# Patient Record
Sex: FEMALE
[De-identification: names, ages, dates, MRNs, and addresses within clinical notes are randomized per-mention and may not be internally consistent; named-entity substitution may affect disease eponyms.]

---

## 2018-10-26 ENCOUNTER — HOSPITAL ENCOUNTER (INPATIENT)
Dept: HOSPITAL 89 - ER | Age: 53
LOS: 1 days | Discharge: HOME | DRG: 176 | End: 2018-10-27
Attending: INTERNAL MEDICINE | Admitting: INTERNAL MEDICINE
Payer: SELF-PAY

## 2018-10-26 VITALS — SYSTOLIC BLOOD PRESSURE: 118 MMHG | DIASTOLIC BLOOD PRESSURE: 73 MMHG

## 2018-10-26 VITALS — SYSTOLIC BLOOD PRESSURE: 133 MMHG | DIASTOLIC BLOOD PRESSURE: 78 MMHG

## 2018-10-26 VITALS
HEIGHT: 57 IN | BODY MASS INDEX: 47.46 KG/M2 | HEIGHT: 57 IN | WEIGHT: 220 LBS | WEIGHT: 220 LBS | BODY MASS INDEX: 47.46 KG/M2

## 2018-10-26 VITALS — SYSTOLIC BLOOD PRESSURE: 133 MMHG | DIASTOLIC BLOOD PRESSURE: 88 MMHG

## 2018-10-26 DIAGNOSIS — R09.02: ICD-10-CM

## 2018-10-26 DIAGNOSIS — Z88.2: ICD-10-CM

## 2018-10-26 DIAGNOSIS — Z88.8: ICD-10-CM

## 2018-10-26 DIAGNOSIS — F17.210: ICD-10-CM

## 2018-10-26 DIAGNOSIS — I82.432: ICD-10-CM

## 2018-10-26 DIAGNOSIS — I82.492: ICD-10-CM

## 2018-10-26 DIAGNOSIS — J44.9: ICD-10-CM

## 2018-10-26 DIAGNOSIS — I82.412: ICD-10-CM

## 2018-10-26 DIAGNOSIS — E66.9: ICD-10-CM

## 2018-10-26 DIAGNOSIS — I10: ICD-10-CM

## 2018-10-26 DIAGNOSIS — I26.99: Primary | ICD-10-CM

## 2018-10-26 DIAGNOSIS — I82.812: ICD-10-CM

## 2018-10-26 DIAGNOSIS — D50.0: ICD-10-CM

## 2018-10-26 DIAGNOSIS — I42.9: ICD-10-CM

## 2018-10-26 LAB
INR PPP: 1.09
PLATELET COUNT, AUTOMATED: 239 K/UL (ref 150–450)

## 2018-10-26 PROCEDURE — 36415 COLL VENOUS BLD VENIPUNCTURE: CPT

## 2018-10-26 PROCEDURE — 82040 ASSAY OF SERUM ALBUMIN: CPT

## 2018-10-26 PROCEDURE — 96361 HYDRATE IV INFUSION ADD-ON: CPT

## 2018-10-26 PROCEDURE — 84520 ASSAY OF UREA NITROGEN: CPT

## 2018-10-26 PROCEDURE — 84450 TRANSFERASE (AST) (SGOT): CPT

## 2018-10-26 PROCEDURE — 82565 ASSAY OF CREATININE: CPT

## 2018-10-26 PROCEDURE — 83540 ASSAY OF IRON: CPT

## 2018-10-26 PROCEDURE — 81001 URINALYSIS AUTO W/SCOPE: CPT

## 2018-10-26 PROCEDURE — 71275 CT ANGIOGRAPHY CHEST: CPT

## 2018-10-26 PROCEDURE — 84132 ASSAY OF SERUM POTASSIUM: CPT

## 2018-10-26 PROCEDURE — 83880 ASSAY OF NATRIURETIC PEPTIDE: CPT

## 2018-10-26 PROCEDURE — 84460 ALANINE AMINO (ALT) (SGPT): CPT

## 2018-10-26 PROCEDURE — 83550 IRON BINDING TEST: CPT

## 2018-10-26 PROCEDURE — 82310 ASSAY OF CALCIUM: CPT

## 2018-10-26 PROCEDURE — 96365 THER/PROPH/DIAG IV INF INIT: CPT

## 2018-10-26 PROCEDURE — 82247 BILIRUBIN TOTAL: CPT

## 2018-10-26 PROCEDURE — 84075 ASSAY ALKALINE PHOSPHATASE: CPT

## 2018-10-26 PROCEDURE — 82435 ASSAY OF BLOOD CHLORIDE: CPT

## 2018-10-26 PROCEDURE — 99285 EMERGENCY DEPT VISIT HI MDM: CPT

## 2018-10-26 PROCEDURE — 84484 ASSAY OF TROPONIN QUANT: CPT

## 2018-10-26 PROCEDURE — 84155 ASSAY OF PROTEIN SERUM: CPT

## 2018-10-26 PROCEDURE — 93005 ELECTROCARDIOGRAM TRACING: CPT

## 2018-10-26 PROCEDURE — 82947 ASSAY GLUCOSE BLOOD QUANT: CPT

## 2018-10-26 PROCEDURE — 85025 COMPLETE CBC W/AUTO DIFF WBC: CPT

## 2018-10-26 PROCEDURE — 94640 AIRWAY INHALATION TREATMENT: CPT

## 2018-10-26 PROCEDURE — 85610 PROTHROMBIN TIME: CPT

## 2018-10-26 PROCEDURE — 82374 ASSAY BLOOD CARBON DIOXIDE: CPT

## 2018-10-26 PROCEDURE — 84295 ASSAY OF SERUM SODIUM: CPT

## 2018-10-26 PROCEDURE — 85730 THROMBOPLASTIN TIME PARTIAL: CPT

## 2018-10-26 RX ADMIN — WARFARIN SODIUM SCH MG: 5 TABLET ORAL at 20:46

## 2018-10-26 NOTE — RADIOLOGY IMAGING REPORT
FACILITY: [facility] 

 

PATIENT NAME: Liz Villafana

: 1965

MR: [id number]

V: [id number]

EXAM DATE: [id number]

ORDERING PHYSICIAN: RALEIGH LEONG

TECHNOLOGIST: 

 

Location: [facility] [place]

Patient: Liz Villafana

: 1965

MRN: [id number]

Visit/Account:[id number]

Date of Sevice: 10/26/2018

 

ACCESSION #: [id number]

 

CTA CHEST WW/O CNTR (PULM ANG)

 

HISTORY:  RESP DISTRESS

 

ADDITIONAL HISTORY:  None.

 

TECHNIQUE:  CTA chest with intravenous contrast.  Axial imaging acquired following administration of 
IV contrast timed for maximum opacification of the pulmonary arterial vasculature.  Slab 3-D MIP frantz
nstructed images were also created for further evaluation and interpretation. Reconstruction of the [facility] data set includes multiplanar 2-D in the sagittal and coronal planes and 3-D reconstructed josefina
nal slab MIP series.  3-D images were created by the technologist.Dose Lowering Technique

 

One of the following dose optimization techniques was utilized in the performance of this exam: Autom
ated exposure control; adjustment of the mA and/or kV according to the patient's size; or use of an i
terative  reconstruction technique.  Specific details can be referenced in the facility's radiology C
T exam operational policy.

 

CONTRAST:  75  mL Isovue-370

 

COMPARISON:  None.

 

FINDINGS:

 

Lungs/pleura:  Negative.

 

Heart/vessels:  Pulmonary emboli is identified in the lobar segmental and subsegmental arterial branc
hes to the right lower lobe in the lobar and segmental vessels to the right middle lobe, segmental an
d subsegmental arterial branches to the left lower lobe.

 

Mediastinum/lymph nodes:  Negative.

 

Visualized upper abdomen:  Negative.

 

Bones/soft tissues:  Negative.

 

Additional findings:  None

 

IMPRESSION:

 

There is a moderate volume of pulmonary emboli in the lobar segmental and subsegmental arterial branc
hes to the right lower lobe, lobar and segmental vessels to the right middle lobe and segmental and s
ubsegmental arterial branches to the left lower lobe.

 

Results were called to RALEIGH LEONG at 10/26/2018 3:32 PM.

 

Report Dictated By: Queenie Evans MD at 10/26/2018 3:27 PM

 

Report E-Signed By: Queenie Evans MD  at 10/26/2018 3:42 PM

 

WSN:[id number]

## 2018-10-26 NOTE — ER REPORT
History and Physical


Time Seen By MD:  13:42


Hx. of Stated Complaint:  


presents from home for increasing L leg pain x 1 week, increased SOB, was due 


for CT scan in September but did not go.


HPI/ROS


CHIEF COMPLAINT: Shortness breath, left lower extremity swelling, increased 


dyspnea





HISTORY OF PRESENT ILLNESS: Patient is a 53-year-old female with a history of 


asthma here with complaints of left lower extremity edema, increasing shortness 


breath, hypoxia in the setting of asthma with suspected exacerbation. Patient 


reportedly was seen several days ago at urgent care several days ago and placed 


on prednisone, Z-Robert for suspected may acquired pneumonia. Patient reports 


increased dyspnea, cough, left lower extremity calf pain and swelling. Patient 


was found to be hypoxic in the 80s with no prior supplemental oxygen r


equirement. She is a current smoker of approximately one half pack per day.





REVIEW OF SYSTEMS:


Constitutional: No fever, no chills.


Eyes: No discharge.


ENT: No sore throat. 


Cardiovascular: + mild chest pain, no palpitations.


Respiratory: + cough, + increased shortness of breath.


Gastrointestinal: No abdominal pain, no vomiting.


Genitourinary: No hematuria.


Musculoskeletal: + left calf tenderness x 1 week


Skin: No rashes.


Neurological: No headache.


Allergies:  


Coded Allergies:  


     Sulfa (Sulfonamide Antibiotics) (Verified  Allergy, Unknown, 10/26/18)


     cefuroxime (Verified  Allergy, Unknown, 10/26/18)


Home Meds


Active Scripts


Iron Ag,Ps/C/Fa6/B12/Zn/SA/Sto (Niferex Tablet) 150MG-60-1 Tablet, 1 TAB PO BID,


#60 TAB 1 Refill


   Prov:ARIANNA CAZARES MD         10/27/18


Oxycodone Hcl (OXYCODONE HCL) 5 Mg Tablet, 5-10 MG PO Q6H PRN for PAIN for 7 


Days, #30 TAB 0 Refills


   Prov:ARIANNA CAZARES MD         10/27/18


Enoxaparin Sodium (LOVENOX) 100 Mg/1 Ml Disp.syrin, 100 MG SQ Q12H for 3 Days, 


#6 SYR


   Prov:ARIANNA CAZARES MD         10/27/18


Warfarin Sodium (COUMADIN) 5 Mg Tablet, 5 MG PO QDAY@13 for 30 Days, #30 TAB 0 


Refills


   Prov:ARIANNA CAZARES MD         10/27/18


Reported Medications


Lisinopril (LISINOPRIL) 20 Mg Tablet, 20 MG PO QDAY, TAB


   10/26/18


Metoprolol Succinate (METOPROLOL SUCCINATE) 25 Mg Tab.er.24h, 1 TAB PO QDAY, TAB


   10/26/18


Azithromycin (ZITHROMAX) 250 Mg Tablet, 1 TAB PO QDAY, TAB


   10/26/18


Prednisone (PREDNISONE) 20 Mg Tablet, 20 MG PO BID, TAB


   10/26/18


Albuterol Sulfate 90 Mcg/Act (PROAIR HFA 90 MCG/ACT) 8.5 Gm Hfa.aer.ad, 1-2 PUFF


IH 3-4XD, INHALER


   10/26/18


Mometasone/Formoterol (DULERA 100 MCG/5 MCG INHALER) 13 Gm Inh, 13 GM INH, INH


   10/26/18


Discontinued Reported Medications


Budesonide (BUDESONIDE) 0.25 Mg/2 Ml Ampul.neb, 0.25 MG IH BID, ML


   10/26/18


Lisinopril (LISINOPRIL) 10 Mg Tablet, 25 MG PO QDAY, TAB


   10/26/18


Lisinopril (LISINOPRIL) 20 Mg Tablet, 20 MG PO QDAY, TAB


   10/26/18


Constitutional





Vital Sign - Last 24 Hours








 10/26/18 10/26/18 10/26/18 10/26/18





 13:33 13:56 13:56 14:02


 


Temp 98.5   


 


Pulse 73  74 76


 


Resp 16  18 18


 


B/P (MAP) 149/97   


 


Pulse Ox 86 95  


 


O2 Delivery Room Air Nasal Cannula  


 


O2 Flow Rate  2.0  


 


    





 10/26/18 10/26/18 10/26/18 10/26/18





 14:02 14:20 14:22 15:37


 


Pulse  72  68


 


Resp  16  16


 


B/P (MAP)  149/97 (114)  139/85 (103)


 


Pulse Ox 94 94  92


 


O2 Delivery Nasal Cannula Room Air  Nasal Cannula


 


O2 Flow Rate 2.0 2 2.0 2








Physical Exam


   General Appearance: The patient is alert, has no immediate need for airway 


protection and no signs of toxicity. Mild distress secondary to shortness of 


breath.


Eyes: Pupils equal and round no pallor or injection.


ENT, Mouth: Mucous membranes are moist.


Respiratory: There are no retractions, lungs are clear to auscultation.


Cardiovascular: Regular rate and rhythm. 


Gastrointestinal:  Abdomen is soft and non tender, no masses, bowel sounds 


normal.


Neurological: NV intact with no focal deficits


Skin: Warm and dry, no rashes.


Musculoskeletal:  Neck is supple non tender.


      Left calf tenderness with mild edema without erythema





DIFFERENTIAL DIAGNOSIS: After history and physical exam differential diagnosis 


was considered for shortness of breath including but not limited to pulmonary 


infectious process, COPD, asthma, pulmonary embolus and congestive heart 


failure, DVT





Medical Decision Making


Data Points


Result Diagram:  


10/27/18 0353                                                                   


            10/27/18 0353





Laboratory





Hematology








Test


 10/26/18


13:35 10/26/18


16:06


 


Activated Partial


Thromboplast Time 26 seconds


(23-35) 





 


Total Bilirubin


 0.4 mg/dl


(0.2-1.3) 





 


Aspartate Amino Transf


(AST/SGOT) 48 U/L (0-35) 


 





 


Alanine Aminotransferase


(ALT/SGPT) 113 U/L (0-56) 


 





 


Alkaline Phosphatase 92 U/L (0-126)  


 


Troponin I < 0.012 ng/ml  


 


B-Type Natriuretic Peptide


 357 pg/ml


(0-100) 





 


Total Protein


 7.1 g/dl


(6.3-8.2) 





 


Albumin


 4.1 g/dl


(3.5-5.0) 





 


Urine Color  Yellow 


 


Urine Clarity  Clear 


 


Urine pH


 


 6.0 pH


(4.8-9.5)


 


Urine Specific Gravity  1.036 


 


Urine Protein


 


 Negative mg/dL


(NEGATIVE)


 


Urine Glucose (UA)


 


 Negative mg/dL


(NEGATIVE)


 


Urine Ketones


 


 Negative mg/dL


(NEGATIVE)


 


Urine Blood


 


 Negative


(NEGATIVE)


 


Urine Nitrite


 


 Negative


(NEGATIVE)


 


Urine Bilirubin


 


 Negative


(NEGATIVE)


 


Urine Urobilinogen


 


 Negative mg/dL


(0.2-1.9)


 


Urine Leukocyte Esterase


 


 Negative


(NEGATIVE)


 


Urine RBC


 


 <1 /HPF


(0-2/HPF)


 


Urine WBC


 


 1 /HPF


(0-5/HPF)


 


Urine Squamous Epithelial


Cells 


 Few /LPF


(</=FEW)


 


Urine Bacteria


 


 Negative /HPF


(NONE-FEW)


 


Urine Mucus


 


 None /HPF


(NONE-FEW)








Chemistry








Test


 10/26/18


13:35 10/26/18


16:06


 


Activated Partial


Thromboplast Time 26 seconds


(23-35) 





 


Total Bilirubin


 0.4 mg/dl


(0.2-1.3) 





 


Aspartate Amino Transf


(AST/SGOT) 48 U/L (0-35) 


 





 


Alanine Aminotransferase


(ALT/SGPT) 113 U/L (0-56) 


 





 


Alkaline Phosphatase 92 U/L (0-126)  


 


Troponin I < 0.012 ng/ml  


 


B-Type Natriuretic Peptide


 357 pg/ml


(0-100) 





 


Total Protein


 7.1 g/dl


(6.3-8.2) 





 


Albumin


 4.1 g/dl


(3.5-5.0) 





 


Urine Color  Yellow 


 


Urine Clarity  Clear 


 


Urine pH


 


 6.0 pH


(4.8-9.5)


 


Urine Specific Gravity  1.036 


 


Urine Protein


 


 Negative mg/dL


(NEGATIVE)


 


Urine Glucose (UA)


 


 Negative mg/dL


(NEGATIVE)


 


Urine Ketones


 


 Negative mg/dL


(NEGATIVE)


 


Urine Blood


 


 Negative


(NEGATIVE)


 


Urine Nitrite


 


 Negative


(NEGATIVE)


 


Urine Bilirubin


 


 Negative


(NEGATIVE)


 


Urine Urobilinogen


 


 Negative mg/dL


(0.2-1.9)


 


Urine Leukocyte Esterase


 


 Negative


(NEGATIVE)


 


Urine RBC


 


 <1 /HPF


(0-2/HPF)


 


Urine WBC


 


 1 /HPF


(0-5/HPF)


 


Urine Squamous Epithelial


Cells 


 Few /LPF


(</=FEW)


 


Urine Bacteria


 


 Negative /HPF


(NONE-FEW)


 


Urine Mucus


 


 None /HPF


(NONE-FEW)








Coagulation








Test


 10/26/18


13:35


 


Activated Partial


Thromboplast Time 26 seconds 











Urinalysis








Test


 10/26/18


16:06


 


Urine Color Yellow 


 


Urine Clarity Clear 


 


Urine pH


 6.0 pH


(4.8-9.5)


 


Urine Specific Gravity 1.036 


 


Urine Protein


 Negative mg/dL


(NEGATIVE)


 


Urine Glucose (UA)


 Negative mg/dL


(NEGATIVE)


 


Urine Ketones


 Negative mg/dL


(NEGATIVE)


 


Urine Blood


 Negative


(NEGATIVE)


 


Urine Nitrite


 Negative


(NEGATIVE)


 


Urine Bilirubin


 Negative


(NEGATIVE)


 


Urine Urobilinogen


 Negative mg/dL


(0.2-1.9)


 


Urine Leukocyte Esterase


 Negative


(NEGATIVE)


 


Urine RBC


 <1 /HPF


(0-2/HPF)


 


Urine WBC


 1 /HPF


(0-5/HPF)


 


Urine Squamous Epithelial


Cells Few /LPF


(</=FEW)


 


Urine Bacteria


 Negative /HPF


(NONE-FEW)


 


Urine Mucus


 None /HPF


(NONE-FEW)











EKG/Imaging


Monitor Interpretation:  Normal Sinus Rhythm


Imaging


ACCESSION #: [id number]


 


Left lower extremity venous Doppler duplex ultrasound scan.


 


HISTORY: Left calf swelling, pulmonary embolism.


 


COMPARISON: Chest CTA scan 10/26/2018.


 


A venous color flow Doppler duplex ultrasound scan with spectral analysis was 


performed on the left lower extremity.  Noncompressible thrombus is present in 


the trifurcation veins, popliteal vein, superficial femoral vein, common femoral


vein, and profunda femoris vein.  The left iliac veins are obscured.


 


IMPRESSION:


 


Extensive left lower extremity deep vein thrombosis.





CTA CHEST WW/O CNTR (PULM ANG)


 


HISTORY:  RESP DISTRESS


 


ADDITIONAL HISTORY:  None.


 


TECHNIQUE:  CTA chest with intravenous contrast.  Axial imaging acquired 


following administration of IV contrast timed for maximum opacification of the 


pulmonary arterial vasculature.  Slab 3-D MIP reconstructed images were also 


created for further evaluation and interpretation. Reconstruction of the source 


data set includes multiplanar 2-D in the sagittal and coronal planes and 3-D 


reconstructed coronal slab MIP series.  3-D images were created by the 


technologist.Dose Lowering Technique


 


One of the following dose optimization techniques was utilized in the 


performance of this exam: Automated exposure control; adjustment of the mA 


and/or kV according to the patient's size; or use of an iterative  


reconstruction technique.  Specific details can be referenced in the facility's 


radiology CT exam operational policy.


 


CONTRAST:  75  mL Isovue-370


 


COMPARISON:  None.


 


FINDINGS:


 


Lungs/pleura:  Negative.


 


Heart/vessels:  Pulmonary emboli is identified in the lobar segmental and 


subsegmental arterial branches to the right lower lobe in the lobar and 


segmental vessels to the right middle lobe, segmental and subsegmental arterial 


branches to the left lower lobe.


 


Mediastinum/lymph nodes:  Negative.


 


Visualized upper abdomen:  Negative.


 


Bones/soft tissues:  Negative.


 


Additional findings:  None


 


IMPRESSION:


 


There is a moderate volume of pulmonary emboli in the lobar segmental and 


subsegmental arterial branches to the right lower lobe, lobar and segmental 


vessels to the right middle lobe and segmental and subsegmental arterial 


branches to the left lower lobe.





ED Course/Re-evaluation


ED Course


Patient is a 53-year-old female here with complaints of increasing dyspnea over 


the past week, left lower extremity swelling at the calf with mild edema acutely


worsen or past several days. Patient does have history significant for asthma, 


active smoking history approximately one half pack per day. She was seen several


days ago at urgent care and placed on prednisone and azithromycin. Today she 


reported worsening symptoms prompting evaluation. Due to the patient's history 


and physical examination, decision was made to do a CT PE and a duplex of the 


left lower extremity. Patient was noted to have a lateral pulmonary embolisms of


the right lower lobe, right middle lobe, left lower lobe and left lower 


extremity DVT. Patient was also noted to have leukocytosis of 17.9. Lungs are 


clear to auscultation at time of evaluation. She was placed on supplemental 


oxygen which she does not require baseline due to hypoxia. I discussed the 


patient with hospitalist and give the patient a dose of Levaquin and Lovenox. 


Heparin had been canceled after discussing with the hospitalist. Patient also 


received IV fluids, DuoNeb. Patient remained otherwise hemodynamically stable 


throughout course. Patient was admitted to the hospitalist service in stable 


condition.


Decision to Disposition Date:  Oct 26, 2018


Decision to Disposition Time:  14:00





Depart


Departure


Latest Vital Signs





Vital Signs








  Date Time  Temp Pulse Resp B/P (MAP) Pulse Ox O2 Delivery O2 Flow Rate FiO2


 


10/26/18 15:37  68 16 139/85 (103) 92 Nasal Cannula 2 


 


10/26/18 13:33 98.5       








Impression:  


   Primary Impression:  


   PE (pulmonary thromboembolism)


Condition:  Improved


Disposition:  Admitted from ER (admitted to hospitalist service)


New Scripts


Iron Ag,Ps/C/Fa6/B12/Zn/SA/Sto (Niferex Tablet) 150MG-60-1 Tablet


1 TAB PO BID, #60 TAB 1 Refill


   Prov: ARIANNA CAZARES MD         10/27/18 


Oxycodone Hcl (OXYCODONE HCL) 5 Mg Tablet


5-10 MG PO Q6H PRN for PAIN for 7 Days, #30 TAB 0 Refills


   Prov: ARIANNA CAZARES MD         10/27/18 


Enoxaparin Sodium (LOVENOX) 100 Mg/1 Ml Disp.syrin


100 MG SQ Q12H for 3 Days, #6 SYR


   Prov: ARIANNA CAZARES MD         10/27/18 


Warfarin Sodium (COUMADIN) 5 Mg Tablet


5 MG PO QDAY@13 for 30 Days, #30 TAB 0 Refills


   Prov: ARIANNA CAZARES MD         10/27/18











RALEIGH LEONG DO           Oct 26, 2018 13:42

## 2018-10-26 NOTE — ER REPORT
History and Physical


Time Seen By MD:  13:27


Allergies:  


Coded Allergies:  


     Sulfa (Sulfonamide Antibiotics) (Verified  Allergy, Unknown, 10/26/18)


     cefuroxime (Verified  Allergy, Unknown, 10/26/18)


Home Meds


Reported Medications


Lisinopril (LISINOPRIL) 20 Mg Tablet, 20 MG PO QDAY, TAB


   10/26/18


Metoprolol Succinate (METOPROLOL SUCCINATE) 25 Mg Tab.er.24h, 1 TAB PO QDAY, TAB


   10/26/18


Azithromycin (ZITHROMAX) 250 Mg Tablet, 1 TAB PO QDAY, TAB


   10/26/18


Budesonide (BUDESONIDE) 0.25 Mg/2 Ml Ampul.neb, 0.25 MG IH BID, ML


   10/26/18


Prednisone (PREDNISONE) 20 Mg Tablet, 20 MG PO BID, TAB


   10/26/18


Albuterol Sulfate 90 Mcg/Act (PROAIR HFA 90 MCG/ACT) 8.5 Gm Hfa.aer.ad, 1-2 PUFF


IH 3-4XD, INHALER


   10/26/18


Mometasone/Formoterol (DULERA 100 MCG/5 MCG INHALER) 13 Gm Inh, 13 GM INH, INH


   10/26/18





Depart


Departure


Condition:  Stable


Disposition:  HOME OR SELF-CARE











TRAY MICHEL                Oct 26, 2018 13:27

## 2018-10-26 NOTE — RADIOLOGY IMAGING REPORT
FACILITY: [facility] 

 

PATIENT NAME: Liz Villafana

: 1965

MR: [id number]

V: [id number]

EXAM DATE: 

ORDERING PHYSICIAN: RALEIGH LEONG

TECHNOLOGIST: 

 

Location: [facility]

Patient: Liz Villafana

: 1965

MRN: [id number]

Visit/Account:[id number]

Date of Sevice: 10/26/2018

 

ACCESSION #: [id number]

 

Left lower extremity venous Doppler duplex ultrasound scan.

 

HISTORY: Left calf swelling, pulmonary embolism.

 

COMPARISON: Chest CTA scan 10/26/2018.

 

A venous color flow Doppler duplex ultrasound scan with spectral analysis was performed on the left l
ower extremity.  Noncompressible thrombus is present in the trifurcation veins, popliteal vein, super
ficial femoral vein, common femoral vein, and profunda femoris vein.  The left iliac veins are obscur
ed.

 

IMPRESSION:

 

Extensive left lower extremity deep vein thrombosis.

 

The patient's provider has been paged by the Imaging  at 4:15 PM on 10/26/2018..

 

Report Dictated By: Tato Nieto MD at 10/26/2018 4:07 PM

 

Report E-Signed By: Tato Nieto MD  at 10/26/2018 4:12 PM

 

WSN:LPH-[id number]

## 2018-10-26 NOTE — EKG
FACILITY: [facility] 

 

PATIENT NAME: MAKSIM CADET

: [id number]

MR: [id number]

V: [id number]

EXAM DATE: 

ORDERING PHYSICIAN: RALEIGH LEONG

TECHNOLOGIST: ANAND

 

Test Reason : L LEG PAIN

Blood Pressure : ***/*** mmHG

Vent. Rate : 071 BPM     Atrial Rate : 071 BPM

   P-R Int : 134 ms          QRS Dur : 084 ms

    QT Int : 374 ms       P-R-T Axes : -09 025 -45 degrees

   QTc Int : 406 ms

 

Normal sinus rhythm

Anterior infarct , age undetermined

Diffuse T inversion

Abnormal ECG

No previous ECGs available

Confirmed by REN OCASIO ([id number]) on 10/26/2018 8:42:25 PM

 

Referred By:  SALO           Confirmed By:REN OCASIO

## 2018-10-26 NOTE — HISTORY & PHYSICAL
History of Present Illness


History of Present Illness


52yo female with a h/o cardiomyopathy, obesity, and COPD who came to the ER for 


left leg pain and SOB.  She developed left leg pain about a week ago.  She 


thinks she woke up with it and denies any trauma.  The pain isn't the medial 


calf and radiates around the patella to the popliteal fossa.  It was mild at 


first and she thought it was related to over use.  However, it has progressively


worsened such that she has had difficulty ambulating.  She thought she was 


having another pneumonia because she was getting SOB, her home pulse oximeter 


read about 70% and had a productive cough, so went to Urgent care a couple of 


days ago.  They started her on a Z-yunier and a prednisone taper.  She, initially, 


was hypoxic, but they were able to get her room air saturations up with 


nebulizer treatments.  The has never had a DVT or PE.  She does smoke 1/2 ppd.  


She reports that her cardiomyopathy was found incidentally on pre-op work up for


a sinus surgery 3 years ago.  She ended up getting a heart catheterization.  She


doesn't know the details of what was found but she is on Toprol.





In the ER, she was given levofloxacin, DuoNeb, and IVF.





History


Problems:  


(1) HTN (hypertension)


Status:  Chronic


(2) Cardiomyopathy


Status:  Chronic


(3) COPD (chronic obstructive pulmonary disease)


Status:  Chronic


(4) Obesity


Home Meds


Reported Medications


Lisinopril (LISINOPRIL) 20 Mg Tablet, 20 MG PO QDAY, TAB


   10/26/18


Metoprolol Succinate (METOPROLOL SUCCINATE) 25 Mg Tab.er.24h, 1 TAB PO QDAY, TAB


   10/26/18


Azithromycin (ZITHROMAX) 250 Mg Tablet, 1 TAB PO QDAY, TAB


   10/26/18


Budesonide (BUDESONIDE) 0.25 Mg/2 Ml Ampul.neb, 0.25 MG IH BID, ML


   10/26/18


Prednisone (PREDNISONE) 20 Mg Tablet, 20 MG PO BID, TAB


   10/26/18


Albuterol Sulfate 90 Mcg/Act (PROAIR HFA 90 MCG/ACT) 8.5 Gm Hfa.aer.ad, 1-2 PUFF


IH 3-4XD, INHALER


   10/26/18


Mometasone/Formoterol (DULERA 100 MCG/5 MCG INHALER) 13 Gm Inh, 13 GM INH, INH


   10/26/18


Discontinued Reported Medications


Lisinopril (LISINOPRIL) 10 Mg Tablet, 25 MG PO QDAY, TAB


   10/26/18


Lisinopril (LISINOPRIL) 20 Mg Tablet, 20 MG PO QDAY, TAB


   10/26/18


Allergies:  


Coded Allergies:  


     Sulfa (Sulfonamide Antibiotics) (Verified  Allergy, Unknown, 10/26/18)


     cefuroxime (Verified  Allergy, Unknown, 10/26/18)


Patient History:  


FH: COPD (chronic obstructive pulmonary disease)


  MOTHER


FH: diabetes mellitus


  MOTHER


FH: heart disease


  MOTHER


FH: hypercholesterolemia


  MOTHER


FH: hypertension


  MOTHER


Pulmonary hypertension


  MOTHER


Other Social/Family Hx


No alcohol use.


Hx Smoking:  Yes (1/2 ppd)


Exposure to Second Hand Smoke?:  Yes


Caffeine Intake:  Tea


Hx Substance Use Disorder:  No





Review of Systems


All Systems Reviewed/Normal:  Yes, Except as Noted





Exam


Vital Signs





Vital Signs








  Date Time  Temp Pulse Resp B/P (MAP) Pulse Ox O2 Delivery O2 Flow Rate FiO2


 


10/26/18 20:01     93 Nasal Cannula 1.5 


 


10/26/18 19:02 97.3 84 20 118/73 (88)    








General Appearance:  Alert, Awake, No Acute Distress


Neuro:  No Gross deficits


Eyes:  PERRLA


ENT:  Moist Mucous Membranes


Cardiovascular:  Regular Rate and Rhythm, No JVD


Respiratory:  Clear to Auscultation


GI:  Abd Soft and Non-Tender


Musculoskeletal:  Other (positive devorah's sign on left.  Some medial thigh pain 


with palpation.  No difference noted in leg size of visual exam)


Extremities:  Edema





Medical Decision Making


Data Points


Result Diagram:  


10/26/18 1335                                                                   


            10/26/18 1335














Item Value  Date Time


 


Mean Corpuscular Volume 70.1 fL L 10/26/18 1335


 


Mean Corpuscular Hemoglobin 20.9 pg L 10/26/18 1335


 


Red Cell Distribution Width 20.7 % H 10/26/18 1335


 


Neutrophils (%) (Auto) 72.0 % 10/26/18 1335


 


Prothromb Time International Ratio 1.09 10/26/18 1335


 


B-Type Natriuretic Peptide 357 pg/ml H 10/26/18 1335


 


Troponin I < 0.012 ng/ml 10/26/18 1335


 


Aspartate Amino Transf (AST/SGOT) 48 U/L H 10/26/18 1335


 


Total Bilirubin 0.4 mg/dl 10/26/18 1335


 


Alanine Aminotransferase (ALT/SGPT) 113 U/L H 10/26/18 1335


 


Alkaline Phosphatase 92 U/L 10/26/18 1335


 


Blood Urea Nitrogen 41 mg/dl H 10/26/18 1335


 


Creatinine 1.10 mg/dl H 10/26/18 1335


 


Urine RBC <1 /HPF 10/26/18 1606


 


Urine WBC 1 /HPF 10/26/18 1606


 


Urine Squamous Epithelial Cells Few /LPF 10/26/18 1606


 


Urine Bacteria Negative /HPF 10/26/18 1606


 


Urine Mucus None /HPF 10/26/18 1606











EKG / Imaging


EKG Interpretation


NSR, poor r wave progression, and diffuse T wave inversion


Imaging


Venogram - Extensive left lower extremity deep vein thrombosis.





Chest CTA - There is a moderate volume of pulmonary emboli in the lobar 


segmental and subsegmental arterial branches to the right lower lobe, lobar and 


segmental vessels to the right middle lobe and segmental and subsegmental 


arterial branches to the left lower lobe.





Assessment and Plan


Problems:  


(1) PE (pulmonary thromboembolism)


Status:  Acute


Assessment & Plan:  She presented with a couple days of "bronchitis" symptoms 


and hypoxia.  She was found to have moderate volume bilateral PE.  She was 


started on Lovenox in the ER.  One complicating factor is that she has a 


microcytic anemia.  No reports of blood in stool or melena.  Will draw a CBC and


iron studies before the next dose of Lovenox.  She is to get dose of Protonix 


this evening and in the morning.  Will guaiac stools.  See below.  She will get 


a dose of warfarin tonight.  Daily INR.





(2) Microcytic anemia


Status:  Chronic


Assessment & Plan:  It is unclear how long she has had this.  Will do a work up 


and watch closely for bleeding.





(3) COPD (chronic obstructive pulmonary disease)


Status:  Chronic


Assessment & Plan:  Lungs are clear.  She was started on a Z-yunier and prednisone 


taper on 10/23, which she felt has been helping.  She received a dose of 


Levofloxacin in the ER, so will restart azithromycin tomorrow and continue 


prednisone.  Chronic Dulera will be continued with albuterol nebs prn.  She 


reports being on a budesonide neb, but will hold for now because it is a 


redundant inhaled steroid.





(4) Cardiomyopathy


Status:  Chronic


Assessment & Plan:  She is reports that 3 years ago this was discovered 


incidentally on a pre-op work up.  She had a heart catheterization and was 


started on Toprol.  The patient isn't sure about EF or other details.  Continue 


Toprol.





(5) HTN (hypertension)


Status:  Chronic


Assessment & Plan:  Continue lisinopril with parameters.





(6) Obesity





Venous Thromboembolism


Antithrombotics


Is Pt On Any Antithrombotics?:  Yes





Exam


Sepsis Risk:  No Definite Risk











REN OCASIO MD               Oct 26, 2018 21:01

## 2018-10-27 VITALS — DIASTOLIC BLOOD PRESSURE: 96 MMHG | SYSTOLIC BLOOD PRESSURE: 144 MMHG

## 2018-10-27 VITALS — SYSTOLIC BLOOD PRESSURE: 123 MMHG | DIASTOLIC BLOOD PRESSURE: 85 MMHG

## 2018-10-27 LAB
INR PPP: 1.14
PLATELET COUNT, AUTOMATED: 194 K/UL (ref 150–450)

## 2018-10-27 RX ADMIN — WARFARIN SODIUM SCH MG: 5 TABLET ORAL at 12:13

## 2018-10-27 NOTE — HOSPITALIST DEPART
Discharge Summary


Reason for Hosp/Final Diag:  


(1) PE (pulmonary thromboembolism)


Status:  Acute


Hospital Course & Plan:  She presented with a couple days of "bronchitis" 


symptoms (cough and dyspnea). She was also found to be hypoxic. On CT pulmonary 


angiogram showed moderate volume bilateral pulmonary emboli.  She was started on


Lovenox in the ER and oral warfarin therapy was initiated. She remained stable 


with respect to her pulse and blood pressure. She was borderline hypoxic with 


room air oxygen saturations in 85-86% range and will be discharged on 1L oxygen 


continuous. She was doing fairly well and wished to be discharged home if at all


possible. She stated she would follow up very closely with her primary care at 


the local urgent care center. She will be discharged on Lovenox 100mg SQ q12hrs 


and warfarin 5mg daily. She will need to have a follow up protime/INR on Monday 


10/29/18. She was given a Rx for Lovenox for three days of therapy with one 


refill if needed. If her protime/INR is not therapeutic on Monday she will need 


to continue the Lovenox until her INR is >2.0. She will need a minimum of 6-9 


months of treatment with anticoagulation. One complicating factor is that she 


has a microcytic anemia.  No reports of obvious blood or melena in her stools.  


She does, however, have a history of "borderline" anemia throughout her 


adulthood. She has had heavy menses (7 days of heavy flow with pad changes 


several times a day) for more than 30 years following two pregnancies. She is 


now post-menopausal and reports no vaginal/uterine bleeding for 2-3 years. Iron 


studies are consistent with iron deficiency anemia. She will need to have this 


fully evaluated. 





(2) Microcytic anemia


Status:  Chronic


Hospital Course & Plan:  (See above). It is unclear how long she has had this, 


but it sounds as if it is most likely related to her history of heavy menses. 


She has not had colonoscopy screening as of yet. She was advised she will need t


o follow up with her primary care provider very closely and will need to have 


her colon cancer screening in near future. She was started on Niferex 150 PO 


BID. She will need follow up on her labs periodically.





(3) COPD (chronic obstructive pulmonary disease)


Status:  Chronic


Hospital Course & Plan:  Lungs are clear.  She was started on a Z-Robert and 


prednisone taper on 10/23/18, which she felt has been helping.  She will 


complete this regimen. She will continue her Dulera and albuterol prn.





(4) Cardiomyopathy


Status:  Chronic


Hospital Course & Plan:  She is reports that 3 years ago this was discovered 


incidentally on a pre-op work up.  She had a heart catheterization and was 


started on metoprolol.  The patient isn't sure about much more regarding this or


other details. 





(5) HTN (hypertension)


Status:  Chronic


Hospital Course & Plan:  Continue lisinopril.





(6) Obesity


Status:  Chronic


Departure


Weight (Pounds):  220


Result Diagram:  


10/27/18 0353                                                                   


            10/27/18 0353














Item Value  Date Time


 


White Blood Count 17.9 k/uL H 10/26/18 1335


 


Hemoglobin 10.5 g/dL L 10/26/18 1335


 


Hematocrit 35.4 % 10/26/18 1335


 


Platelet Count 239 K/uL 10/26/18 1335


 


Sodium Level 140 mmol/L 10/26/18 1335


 


Potassium Level 4.7 mmol/L 10/26/18 1335


 


Chloride Level 103 mmol/L 10/26/18 1335


 


Carbon Dioxide Level 24 mmol/L 10/26/18 1335


 


Blood Urea Nitrogen 41 mg/dl H 10/26/18 1335


 


Creatinine 1.10 mg/dl H 10/26/18 1335


 


Glomerular Filtration Rate Calc 52.2 10/26/18 1335


 


Random Glucose 90 mg/dl 10/26/18 1335


 


Calcium Level 9.6 mg/dl 10/26/18 1335


 


Total Bilirubin 0.4 mg/dl 10/26/18 1335


 


Aspartate Amino Transf (AST/SGOT) 48 U/L H 10/26/18 1335


 


Alanine Aminotransferase (ALT/SGPT) 113 U/L H 10/26/18 1335


 


Troponin I < 0.012 ng/ml 10/26/18 1335


 


Alkaline Phosphatase 92 U/L 10/26/18 1335


 


Total Protein 7.1 g/dl 10/26/18 1335


 


Albumin 4.1 g/dl 10/26/18 1335


 


B-Type Natriuretic Peptide 357 pg/ml H 10/26/18 1335


 


Iron Level 17 ug/dl L 10/27/18 0353


 


Total Iron Binding Capacity 488 ug/dl 10/27/18 0353


 


Percent Iron Saturation 3.5 % 10/27/18 0353


 


Prothrombin Time 14.6 seconds H 10/27/18 0353


 


Prothromb Time International Ratio 1.14 10/27/18 0353


 


Prothrombin Time 14.2 seconds 10/26/18 1335


 


Activated Partial Thromboplast Time 26 seconds 10/26/18 1335


 


Prothromb Time International Ratio 1.09 10/26/18 1335


 


Urine Color Yellow 10/26/18 1606


 


Urine Clarity Clear 10/26/18 1606


 


Urine pH 6.0 pH 10/26/18 1606


 


Urine Specific Gravity 1.036 10/26/18 1606


 


Urine Protein Negative mg/dL 10/26/18 1606


 


Urine Glucose (UA) Negative mg/dL 10/26/18 1606


 


Urine Ketones Negative mg/dL 10/26/18 1606


 


Urine Blood Negative 10/26/18 1606


 


Urine Nitrite Negative 10/26/18 1606


 


Urine Bilirubin Negative 10/26/18 1606


 


Urine Urobilinogen Negative mg/dL 10/26/18 1606


 


Urine Leukocyte Esterase Negative 10/26/18 1606


 


Urine RBC <1 /HPF 10/26/18 1606


 


Urine WBC 1 /HPF 10/26/18 1606


 


Urine Squamous Epithelial Cells Few /LPF 10/26/18 1606


 


Urine Bacteria Negative /HPF 10/26/18 1606


 


Urine Mucus None /HPF 10/26/18 1606








Imaging


PATIENT NAME: Maksim Villafana


: 1965


MR: [id number]


V: [id number]


EXAM DATE: [id number]


ORDERING PHYSICIAN: RALEIGH LEONG


TECHNOLOGIST: 


 


Location: [facility]


Patient: Maksim Villafana


: 1965


MRN: [id number]


Visit/Account:[id number]


Date of Sevice: 10/26/2018


 


ACCESSION #: [id number]


 


CTA CHEST WW/O CNTR (PULM ANG)


 


HISTORY:  RESP DISTRESS


 


ADDITIONAL HISTORY:  None.


 


TECHNIQUE:  CTA chest with intravenous contrast.  Axial imaging acquired 


following administration of IV contrast timed for maximum opacification of the 


pulmonary arterial vasculature.  Slab 3-D MIP reconstructed images were also 


created for further evaluation and interpretation. Reconstruction of the source 


data set includes multiplanar 2-D in the sagittal and coronal planes and 3-D 


reconstructed coronal slab MIP series.  3-D images were created by the 


technologist.Dose Lowering Technique


 


One of the following dose optimization techniques was utilized in the 


performance of this exam: Automated exposure control; adjustment of the mA 


and/or kV according to the patient's size; or use of an iterative  


reconstruction technique.  Specific details can be referenced in the facility's 


radiology CT exam operational policy.


 


CONTRAST:  75  mL Isovue-370


 


COMPARISON:  None.


 


FINDINGS:


 


Lungs/pleura:  Negative.


 


Heart/vessels:  Pulmonary emboli is identified in the lobar segmental and 


subsegmental arterial branches to the right lower lobe in the lobar and 


segmental vessels to the right middle lobe, segmental and subsegmental arterial 


branches to the left lower lobe.


 


Mediastinum/lymph nodes:  Negative.


 


Visualized upper abdomen:  Negative.


 


Bones/soft tissues:  Negative.


 


Additional findings:  None


 


IMPRESSION:


 


There is a moderate volume of pulmonary emboli in the lobar segmental and 


subsegmental arterial branches to the right lower lobe, lobar and segmental 


vessels to the right middle lobe and segmental and subsegmental arterial 


branches to the left lower lobe.


 


Results were called to RALEIGH LEONG at 10/26/2018 3:32 PM.


 


Report Dictated By: Queenie Evans MD at 10/26/2018 3:27 PM


 


Report E-Signed By: Queenie Evans MD  at 10/26/2018 3:42 PM


 


WSN:[email or web address]





PATIENT NAME: Maksim Villafana


: 1965


MR: [id number]


V: [id number]


EXAM DATE: [id number]


ORDERING PHYSICIAN: RALEIGH LEONG


TECHNOLOGIST: 


 


Location: [facility]


Patient: Maksim Villafana


: 1965


MRN: [id number]


Visit/Account:[id number]


Date of Sevice: 10/26/2018


 


ACCESSION #: [id number]


 


Left lower extremity venous Doppler duplex ultrasound scan.


 


HISTORY: Left calf swelling, pulmonary embolism.


 


COMPARISON: Chest CTA scan 10/26/2018.


 


A venous color flow Doppler duplex ultrasound scan with spectral analysis was 


performed on the left lower extremity.  Noncompressible thrombus is present in 


the trifurcation veins, popliteal vein, superficial femoral vein, common femoral


vein, and profunda femoris vein.  The left iliac veins are obscured.


 


IMPRESSION:


 


Extensive left lower extremity deep vein thrombosis.


 


The patient's provider has been paged by the Imaging  at 


4:15 PM on 10/26/2018..


 


Report Dictated By: Tato Nieto MD at 10/26/2018 4:07 PM


 


Report E-Signed By: Tato Nieto MD  at 10/26/2018 4:12 PM


 


WSN:[id number]


EKG


PATIENT NAME: MAKSIM VILLAFANA


: [id number]


MR: [id number]


V: [id number]


EXAM DATE: 


ORDERING PHYSICIAN: RALEIGH LEONG


TECHNOLOGIST: ANAND


 


Test Reason : L LEG PAIN


Blood Pressure : ***/*** mmHG


Vent. Rate : 071 BPM     Atrial Rate : 071 BPM


   P-R Int : 134 ms          QRS Dur : 084 ms


    QT Int : 374 ms       P-R-T Axes : -09 025 -45 degrees


   QTc Int : 406 ms


 


Normal sinus rhythm


Anterior infarct , age undetermined


Diffuse T inversion


Abnormal ECG


No previous ECGs available


Confirmed by REN OCASIO ([id number]) on 10/26/2018 8:42:25 PM


 


Referred By:  SALO           Confirmed By:REN OCASIO








Condition:  Improved


Discharge:  Home, Self Care


Follow-Up Labs:  INR (On Monday 10/29/18 at Urgent Care ([facility]), Other (CBC


in 1 week to monitor anemia)





Discharge Instructions


Home Meds


Active Scripts


Iron Ag,Ps/C/Fa6/B12/Zn/SA/Sto (Niferex Tablet) 150MG-60-1 Tablet, 1 TAB PO BID,


#60 TAB 1 Refill


   Prov:ARIANNA CAZARES MD         10/27/18


Oxycodone Hcl (OXYCODONE HCL) 5 Mg Tablet, 5-10 MG PO Q6H PRN for PAIN for 7 


Days, #30 TAB 0 Refills


   Prov:ARIANNA CAZARES MD         10/27/18


Enoxaparin Sodium (LOVENOX) 100 Mg/1 Ml Disp.syrin, 100 MG SQ Q12H for 3 Days, 


#6 SYR


   Prov:ARIANNA CAZARES MD         10/27/18


Warfarin Sodium (COUMADIN) 5 Mg Tablet, 5 MG PO QDAY@13 for 30 Days, #30 TAB 0 


Refills


   Prov:ARIANNA CAZARES MD         10/27/18


Reported Medications


Lisinopril (LISINOPRIL) 20 Mg Tablet, 20 MG PO QDAY, TAB


   10/26/18


Metoprolol Succinate (METOPROLOL SUCCINATE) 25 Mg Tab.er.24h, 1 TAB PO QDAY, TAB


   10/26/18


Azithromycin (ZITHROMAX) 250 Mg Tablet, 1 TAB PO QDAY, TAB


   10/26/18


Prednisone (PREDNISONE) 20 Mg Tablet, 20 MG PO BID, TAB


   10/26/18


Albuterol Sulfate 90 Mcg/Act (PROAIR HFA 90 MCG/ACT) 8.5 Gm Hfa.aer.ad, 1-2 PUFF


IH 3-4XD, INHALER


   10/26/18


Mometasone/Formoterol (DULERA 100 MCG/5 MCG INHALER) 13 Gm Inh, 13 GM INH, INH


   10/26/18


Discontinued Reported Medications


Budesonide (BUDESONIDE) 0.25 Mg/2 Ml Ampul.neb, 0.25 MG IH BID, ML


   10/26/18


Lisinopril (LISINOPRIL) 10 Mg Tablet, 25 MG PO QDAY, TAB


   10/26/18


Lisinopril (LISINOPRIL) 20 Mg Tablet, 20 MG PO QDAY, TAB


   10/26/18


Diet:  Regular


Activity:  As Tolerated, No Exertion


Special Instructions:  


Home oxygen at 1L via nasal cannula continuously.


Follow up at Urgent Care ([facility]) on Monday 10/29/18.


Return to ER if any problems.





Venous Thromboembolism


Antithrombotics


Is Pt On Any Antithrombotics?:  Yes











ARIANNA CAZARES MD            Oct 27, 2018 10:48

## 2018-10-27 NOTE — ANTIMICROBIAL STEWARDSHIP
Antimicrobial Time Out


Antimicrobial Stewardship


MD Service:  Hospitalist


Indications:  Other (COPD)


Antimicrobial Used


Zithromax Z-yunier. Patient discharged on 10/27 with Rx to complete Zithromax .


Culture Results:  N/A











AGATHA FOOTE                Oct 27, 2018 15:24

## 2018-10-30 ENCOUNTER — HOSPITAL ENCOUNTER (OUTPATIENT)
Dept: HOSPITAL 89 - TCM | Age: 53
LOS: 48 days | Discharge: HOME | End: 2018-12-17
Payer: SELF-PAY

## 2018-10-30 VITALS — BODY MASS INDEX: 47.6 KG/M2

## 2018-10-30 DIAGNOSIS — Z02.9: Primary | ICD-10-CM

## 2018-11-08 ENCOUNTER — HOSPITAL ENCOUNTER (OUTPATIENT)
Dept: HOSPITAL 89 - CT | Age: 53
End: 2018-11-08
Attending: PHYSICIAN ASSISTANT
Payer: SELF-PAY

## 2018-11-08 VITALS — BODY MASS INDEX: 47.6 KG/M2

## 2018-11-08 DIAGNOSIS — I27.82: Primary | ICD-10-CM

## 2018-11-08 PROCEDURE — 71270 CT THORAX DX C-/C+: CPT

## 2018-11-08 NOTE — RADIOLOGY IMAGING REPORT
[place] [facility] [place] 

 

PATIENT NAME: Liz Villafana

: 1965

MR: [id number]

V: [id number]

EXAM DATE: 

ORDERING PHYSICIAN: JADE HASSAN

TECHNOLOGIST: 

 

Location: [facility]

Patient: Liz Villafana

: 1965

MRN: [id number]

Visit/Account:[id number]

Date of Sevice: 2018

 

ACCESSION #: [id number]

 

CHEST W W/O CONTRAST

 

History:  Cough.  Abnormal chest x-ray.

 

ADDITIONAL CLINICAL HISTORY:  Recent pulmonary emboli

 

TECHNIQUE:   Contiguous axial images were performed through the chest to the level of the adrenal gla
nds with and without IV contrast.   Coronal and sagittal reformatting was also performed.  One of the
 following dose optimization techniques was utilized in the performance of this exam: Automated expos
ure control; adjustment of the mA and/or kV according to the patient's size; or use of an iterative  
reconstruction technique.  Specific details can be referenced in the facility's radiology CT exam ope
rational policy.

 

 

Contrast:  75 mL Isovue-370

 

COMPARISON STUDIES:   Comparison CT angiogram chest 10/26/2018 which demonstrates extensive pulmonary
 emboli.

 

Lungs / Pleura:   Lung parenchyma is well-aerated.  There is no evidence of infarction.

 

Mediastinum/nodes:   negative.

 

Heart and vessels:  There are persistent filling defects seen in the pulmonary arterial tree most ext
ensive extending down the right lower lobe but also seen in other lobes as well.  There has been some
 improvement since the previous exam with slight regression of clot burden.  Given history states the
 prior outside chest x-ray demonstrated right hilar prominence which probably reflects pulmonary hype
rtension related to the downstream clot burden.

 

Musculoskeletal / Body wall:   negative.

 

Upper abdomen:   negative.

 

IMPRESSION:

 

Persistent pulmonary emboli most extensive right lower lobe which demonstrate some regression since t
he previous study.  Clot burden is still moderate in severity.

 

Report Dictated By: Marv Gallardo MD at 2018 11:09 AM

 

Report E-Signed By: Marv Gallardo MD  at 2018 11:33 AM

 

WSN:[email or web address]

## 2019-03-11 ENCOUNTER — HOSPITAL ENCOUNTER (EMERGENCY)
Dept: HOSPITAL 89 - ER | Age: 54
Discharge: HOME | End: 2019-03-11
Payer: COMMERCIAL

## 2019-03-11 VITALS — SYSTOLIC BLOOD PRESSURE: 97 MMHG | DIASTOLIC BLOOD PRESSURE: 60 MMHG

## 2019-03-11 VITALS — BODY MASS INDEX: 47.6 KG/M2 | WEIGHT: 220 LBS

## 2019-03-11 DIAGNOSIS — J01.80: Primary | ICD-10-CM

## 2019-03-11 LAB — PLATELET COUNT, AUTOMATED: 242 K/UL (ref 150–450)

## 2019-03-11 PROCEDURE — 82565 ASSAY OF CREATININE: CPT

## 2019-03-11 PROCEDURE — 96374 THER/PROPH/DIAG INJ IV PUSH: CPT

## 2019-03-11 PROCEDURE — 87502 INFLUENZA DNA AMP PROBE: CPT

## 2019-03-11 PROCEDURE — 82310 ASSAY OF CALCIUM: CPT

## 2019-03-11 PROCEDURE — 85025 COMPLETE CBC W/AUTO DIFF WBC: CPT

## 2019-03-11 PROCEDURE — 84155 ASSAY OF PROTEIN SERUM: CPT

## 2019-03-11 PROCEDURE — 83690 ASSAY OF LIPASE: CPT

## 2019-03-11 PROCEDURE — 81001 URINALYSIS AUTO W/SCOPE: CPT

## 2019-03-11 PROCEDURE — 70486 CT MAXILLOFACIAL W/O DYE: CPT

## 2019-03-11 PROCEDURE — 71046 X-RAY EXAM CHEST 2 VIEWS: CPT

## 2019-03-11 PROCEDURE — 84520 ASSAY OF UREA NITROGEN: CPT

## 2019-03-11 PROCEDURE — 96361 HYDRATE IV INFUSION ADD-ON: CPT

## 2019-03-11 PROCEDURE — 82247 BILIRUBIN TOTAL: CPT

## 2019-03-11 PROCEDURE — 99284 EMERGENCY DEPT VISIT MOD MDM: CPT

## 2019-03-11 PROCEDURE — 84450 TRANSFERASE (AST) (SGOT): CPT

## 2019-03-11 PROCEDURE — 82040 ASSAY OF SERUM ALBUMIN: CPT

## 2019-03-11 PROCEDURE — 70450 CT HEAD/BRAIN W/O DYE: CPT

## 2019-03-11 PROCEDURE — 74177 CT ABD & PELVIS W/CONTRAST: CPT

## 2019-03-11 PROCEDURE — 84460 ALANINE AMINO (ALT) (SGPT): CPT

## 2019-03-11 PROCEDURE — 84075 ASSAY ALKALINE PHOSPHATASE: CPT

## 2019-03-11 PROCEDURE — 84132 ASSAY OF SERUM POTASSIUM: CPT

## 2019-03-11 PROCEDURE — 82150 ASSAY OF AMYLASE: CPT

## 2019-03-11 PROCEDURE — 82374 ASSAY BLOOD CARBON DIOXIDE: CPT

## 2019-03-11 PROCEDURE — 82435 ASSAY OF BLOOD CHLORIDE: CPT

## 2019-03-11 PROCEDURE — 84295 ASSAY OF SERUM SODIUM: CPT

## 2019-03-11 PROCEDURE — 82947 ASSAY GLUCOSE BLOOD QUANT: CPT

## 2019-03-11 RX ADMIN — THIAMINE HYDROCHLORIDE ONE MLS/HR: 100 INJECTION, SOLUTION INTRAMUSCULAR; INTRAVENOUS at 19:50

## 2019-03-11 RX ADMIN — THIAMINE HYDROCHLORIDE ONE MLS/HR: 100 INJECTION, SOLUTION INTRAMUSCULAR; INTRAVENOUS at 10:00

## 2019-03-11 NOTE — RADIOLOGY IMAGING REPORT
FACILITY: [facility] 

 

PATIENT NAME: Liz Villafana

: 1965

MR: [id number]

V: [id number]

EXAM DATE: 

ORDERING PHYSICIAN: JERRY NARANJO

TECHNOLOGIST: 

 

Location: [facility]

Patient: Liz Villafana

: 1965

MRN: [id number]

Visit/Account:[id number]

Date of Sevice:  3/11/2019

 

ACCESSION #: [id number]

 

EXAMINATION:

CT head without IV contrast

 

HISTORY:  Sinus congestion.

 

COMPARISON:  CT sinus from 3/11/2019.

 

TECHNIQUE:   Contiguous axial images were obtained from the skull base to the vertex without intraven
ous contrast.  Sagittal and coronal reformatted images are also submitted.

 

One of the following dose optimization techniques was utilized in the performance of this exam: Autom
ated exposure control; adjustment of the mA and/or kV according to the patient's size; or use of an i
terative  reconstruction technique.  Specific details can be referenced in the facility's radiology C
T exam operational policy.

 

FINDINGS:

 

Brain volume:  Normal.

 

Ventricles:  Normal.

 

Acute ischemic changes:  None.

 

Hemorrhage:  No acute intracranial hemorrhage.

 

Masses/edema:  None.

 

Gray-white: Negative.

 

White matter:  Normal.

 

Vessels:  Negative.

 

Extra-axial:  Negative.

 

Calvarium/scalp:  Negative.

 

Skull base/visualized face:  Negative.

 

Visualized sinuses/orbits:  Mild mucosal thickening in the right maxillary sinus and left sphenoid si
nus.  There is nasal septal deviation to the right.

 

IMPRESSION:

 

1.  No acute hemorrhage or intracranial mass lesion. No CT evidence of acute infarct.

 

2.  Mild inflammation of the right maxillary sinus and left sphenoid sinus well be described in [facility]
er detail on the concurrently performed CT sinus report.

 

Report Dictated By: Jeannine Mayfield MD at 3/11/2019 8:21 PM

 

Report E-Signed By: Jeannine Mayfield MD  at 3/11/2019 8:24 PM

 

WSN:[email or web address]

## 2019-03-11 NOTE — RADIOLOGY IMAGING REPORT
FACILITY: [facility] 

 

PATIENT NAME: Liz Villafana

: 1965

MR: [id number]

V: [id number]

EXAM DATE: 

ORDERING PHYSICIAN: JERRY NARANJO

TECHNOLOGIST: 

 

Location: [facility] - [place]

Patient: Liz Villafana

: 1965

MRN: [id number]

Visit/Account:[id number]

Date of Sevice:  3/11/2019

 

ACCESSION #: [id number]

 

EXAMINATION: CT abdomen and pelvis with IV contrast

 

HISTORY:  Abdominal pain. Right flank pain. Nausea vomiting diarrhea.

 

TECHNIQUE:   Axial CT images of the abdomen and pelvis were obtained with IV contrast, with coronal a
nd sagittal 2D reconstructed images.

One of the following dose optimization techniques was utilized in the performance of this exam: Autom
ated exposure control; adjustment of the mA and/or kV according to the patient's size; or use of an i
terative  reconstruction technique.  Specific details can be referenced in the facility's radiology C
T exam operational policy.

 

Contrast:  75 mL of IV Isovue-370.

 

COMPARISON:  None.

 

FINDINGS:

Liver:  Negative.

 

Gallbladder and bile ducts:  Negative.

 

Spleen:  Negative.

 

Pancreas:  Negative.

 

Adrenal glands:  Negative.

 

Kidneys:  There is chronic cortical atrophy of the upper pole of the right kidney. Single punctate no
nobstructing 1-2 mm calculus in the mid right kidney. There is mild cortical scarring along the lower
 pole of the right kidney. Normal size and morphology of the left kidney. No left-sided urinary calcu
li. No hydronephrosis.

 

Bowel and peritoneum:  The small bowel and colon are normal in caliber, without evidence of obstructi
on or any focal inflammatory process. There are a few scattered colonic diverticula without evidence 
of diverticulitis. Normal appendix. No free fluid or free intraperitoneal air.

 

Pelvic  structures:  Negative.

 

Lymph node assessment:  Negative.

 

Vessels:  Negative.

 

Musculoskeletal:   No acute osseous findings. Chronic degenerative changes along the spine. There is 
moderate disc space narrowing at L3-L4, with advanced facet arthropathy along the lower lumbar facet 
joints.

 

Body wall:  Negative.

 

Lung bases:  Negative.

 

IMPRESSION:

1. No CT evidence of acute intraabdominal pathology.

2. Chronic parenchymal atrophy along the upper pole of the right kidney with mild cortical scarring a
long the lower pole. Single punctate nonobstructing right renal calculus.

3. Mild scattered colonic diverticulosis without evidence of diverticulitis.

4. Normal appendix.

 

 

Report Dictated By: Juan Manuel Monteiro MD at 3/11/2019 8:30 PM

 

Report E-Signed By: Juan Manuel Monteiro MD  at 3/11/2019 8:45 PM

 

WSN:[id number]

## 2019-03-11 NOTE — RADIOLOGY IMAGING REPORT
[place] [facility] [place] 

 

PATIENT NAME: Liz Vlilafana

: 1965

MR: [id number]

V: [id number]

EXAM DATE: 

ORDERING PHYSICIAN: JERRY NARANJO

TECHNOLOGIST: 

 

Location: [facility] - [place]

Patient: Liz Villafana

: 1965

MRN: [id number]

Visit/Account:[id number]

Date of Sevice:  3/11/2019

 

ACCESSION #: [id number]

 

EXAMINATION:

CT sinus without IV contrast

 

HISTORY:   Sinus congestion.

 

COMPARISON:  CT head from 3/11/2019.

 

TECHNIQUE:  Contiguous axial images were obtained through the paranasal sinuses without intravenous c
ontrast administration. Coronal and sagittal reformatted images were obtained from the axial source d
modesta.

 

One of the following dose optimization techniques was utilized in the performance of this exam: Autom
ated exposure control; adjustment of the mA and/or kV according to the patient's size; or use of an i
terative  reconstruction technique.  Specific details can be referenced in the facility's radiology C
T exam operational policy.

 

FINDINGS:

 

Maxillary sinuses: There is patchy mucosal thickening with inspissated air in the right maxillary sin
us.

Frontal sinuses:  Minimal mucosal thickening in the bilateral inferior frontal sinuses.  Small fronta
l air cell on the left.

Ethmoid air cells: Minimal mucosal thickening in the bilateral ethmoid air cells.

Sphenoid sinuses: The left sphenoid sinus is larger than the right, which is a normal variant.  The i
ntersinus septum inserts on the anterior wall of the carotid canal.  Mild mucosal thickening in the l
eft sphenoid sinus.

Ostiomeatal units:  Patent.

 

Nasal septum/nasal cavity: There is nasal septal deviation to the right with a bone spur projecting l
aterally causing moderate narrowing of the right nasal cavity.

 

Orbits: Negative.

Visualized intracranial contents/soft tissues: Negative.

TMJs:  Negative.

 

There is dental disease of the bilateral mandibular molars partly visualized.

 

IMPRESSION:

 

1.  Patchy nonobstructive inflammation of the paranasal sinuses could be acute or chronic, and is wor
st in the right maxillary sinus.

 

2.  Nasal septal deviation to the right with a bone spur projecting laterally causing moderate narrow
ing of the right nasal cavity.

 

3.  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

 

Report Dictated By: Jeannine Mayfield MD at 3/11/2019 8:24 PM

 

Report E-Signed By: Jeannine Mayfield MD  at 3/11/2019 8:29 PM

 

WSN:[id number]

## 2019-03-11 NOTE — ER REPORT
History and Physical


Time Seen By MD:  18:41


Hx. of Stated Complaint:  


SINUS PRESSURE, HA, DIZZINESS, SEEN AT URGENT CARE AND TREATED WITH 2 ROUNDS OF 


ANTIBIOTICS AND STEROIDS W/O RELIEF, NUMBNESS IN FINGERS, "SNOWY" VISION


HPI/ROS


CHIEF COMPLAINT: sinus problems.





HISTORY OF PRESENT ILLNESS: This is a 53 year old female. She has had over a 


month of sinus problems. Has pressure, headache, dizziness. Was treated with Danae


thromycin initially with absolutely no improvement. Then was placed on 


Doxycycline with no improvement. She has Prednisone with both courses of 


antibiotics as well. She takes nasal spray (flonase and a decongestant bid) 


chronically already. History of sinus problems with and ENT following her, but t


hey are no longer in practice. Stitches urgent care here in [place] with the 


recent treatment. On first evaluation, the patient had negative influenza test. 


She uses a Neti Pot regularly as well for sinus rinse. Was going to have sinus 


surgery at one time, but never was done due to some cardiac problems, with a 


cardiomyopathy. She has asthma as well and when she gets sick like this needs 


oxygens, and is on oxygen at this time. No shortness of breath, but cough and 


congestion. During the course of the month, has also had intermittent right 


abdominal pain as well as alternating diarrhea and constipation. No fevers 


noted. Has sore throat. No problems with urination. Eating and drinking okay.


Allergies:  


Coded Allergies:  


     Sulfa (Sulfonamide Antibiotics) (Verified  Allergy, Unknown, 3/11/19)


     cefuroxime (Verified  Allergy, Unknown, 3/11/19)


Home Meds


Active Scripts


Ketorolac Tromethamine (KETOROLAC TROMETHAMINE) 10 Mg Tab, 10 MG PO Q6H PRN for 


PAIN, #12 TAB 0 Refills


   Prov:JERRY NARANJO MD         3/11/19


Levofloxacin 500 Mg Tab (LEVOFLOXACIN 500 MG TAB) 500 Mg Tablet, 500 MG PO QDAY,


#10 TAB 0 Refills


   Prov:JERRY NARANJO MD         3/11/19


Iron Ag,Ps/C/Fa6/B12/Zn/SA/Sto (Niferex Tablet) 150MG-60-1 Tablet, 1 TAB PO BID,


#60 TAB 1 Refill


   Prov:ARIANNA CAZARES MD         10/27/18


Reported Medications


Montelukast Sodium (SINGULAIR) 10 Mg Tablet, 1 TAB PO QDAY, TAB


   3/11/19


Doxycycline Hyclate (DOXYCYCLINE HYCLATE) 100 Mg Tablet, 100 MG PO BID


   3/11/19


Lisinopril (LISINOPRIL) 20 Mg Tablet, 20 MG PO QDAY, TAB


   10/26/18


Metoprolol Succinate (METOPROLOL SUCCINATE) 25 Mg Tab.er.24h, 1 TAB PO QDAY, TAB


   10/26/18


Albuterol Sulfate 90 Mcg/Act (PROAIR HFA 90 MCG/ACT) 8.5 Gm Hfa.aer.ad, 1-2 PUFF


IH 3-4XD, INHALER


   10/26/18


Mometasone/Formoterol (DULERA 100 MCG/5 MCG INHALER) 13 Gm Inh, 13 GM INH, INH


   10/26/18


Discontinued Reported Medications


Azithromycin (ZITHROMAX) 250 Mg Tablet, 1 TAB PO QDAY, TAB


   10/26/18


Prednisone (PREDNISONE) 20 Mg Tablet, 20 MG PO BID, TAB


   10/26/18


Discontinued Scripts


Oxycodone Hcl (OXYCODONE HCL) 5 Mg Tablet, 5-10 MG PO Q6H PRN for PAIN for 7 


Days, #30 TAB 0 Refills


   Prov:ARIANNA CAZARES MD         10/27/18


Enoxaparin Sodium (LOVENOX) 100 Mg/1 Ml Disp.syrin, 100 MG SQ Q12H for 3 Days, 


#6 SYR


   Prov:ARIANNA CAZARES MD         10/27/18


Warfarin Sodium (COUMADIN) 5 Mg Tablet, 5 MG PO QDAY@13 for 30 Days, #30 TAB 0 


Refills


   Prov:ARIANNA CAZARES MD         10/27/18


Reviewed Nurses Notes:  Yes


Hx Smoking:  Yes (1/2 ppd)


Exposure to Second Hand Smoke?:  Yes


Hx Substance Use Disorder:  No


Constitutional





Vital Sign - Last 24 Hours








 3/11/19 3/11/19 3/11/19 3/11/19





 17:58 18:23 18:28 18:30


 


Temp 98.3   


 


Pulse 92   


 


Resp 14   


 


B/P (MAP) 121/94 136/73 (94)  134/73 (93)


 


Pulse Ox 85   


 


O2 Delivery Room Air   


 


O2 Flow Rate   2.0 


 


    





 3/11/19 3/11/19 3/11/19 3/11/19





 18:47 19:00 19:17 19:30


 


Pulse 83  78 


 


B/P (MAP)  121/69 (86)  135/73 (93)


 


Pulse Ox 94  92 





 3/11/19 3/11/19 3/11/19 3/11/19





 19:47 20:00 20:16 20:17


 


Pulse 75   78


 


B/P (MAP)  ???/??? (1665) 120/61 (80) 


 


Pulse Ox 95   96





 3/11/19 3/11/19 3/11/19 3/11/19





 20:30 20:47 21:00 21:17


 


Pulse  74  80


 


B/P (MAP) 138/80 (99)  140/69 (92) 


 


Pulse Ox  95  91





 3/11/19 3/11/19 3/11/19 3/11/19





 21:30 21:30 22:00 22:30


 


Pulse  81 75 73


 


B/P (MAP) 122/94 (103) 122/94 (103) 139/79 (99) 116/63 (80)


 


Pulse Ox  92 91 95





 3/11/19   





 22:42   


 


Pulse 71   


 


B/P (MAP) 97/60 (72)   


 


Pulse Ox 93   








Physical Exam


   General Appearance: The patient is alert. No acute distress. 


Eyes: Pupils are equal, round. Reactive to light. No pallor, injection or 


icterus. 


ENT: Mucous membranes are moist. Normal oral mucosa. Posterior oropharynx with 


postnasal drainage and erythema but no exudates. Nasal mucosa is red and 


erythematous with increased mucus. Normal tympanic membranes and canals.


Neck: Supple and non tender. Has anterior cervical lymphadenopathy, nontender.


Respiratory: Lungs are clear to auscultation, no wheezes, rales or rhonchi 


present. She is using oxygen by nasal cannula at 2 L. No accessory muscle use or


increased work of breathing.


Cardiovascular: Regular rate and rhythm. No murmurs, gallops or rubs. Normal 


capillary refill. No edema. 


Gastrointestinal:  Abdomen is soft, minimal tenderness throughout the right 


abdomen and she does have some right CVA tenderness. Nondistended.  No masses or


organomegaly. Normal active bowel sounds.  


Neurological: Alert and oriented x3. 


Skin: Warm and dry.


Musculoskeletal: Extremities are nontender.





DIFFERENTIAL DIAGNOSIS: After history and physical exam, differential diagnosis 


was considered for patient with ongoing sinus problems, likely viral, especially


given the fact she's had no improvement with 2 courses of antibiotics and 


steroids, but we will do a sinus CT scan today to look further. Viral syndrome 


also likely given her bowel changes and abdominal pain but we'll look further at


other possible causes of this looking urinary changes, labs and imaging.





Medical Decision Making


Data Points


Result Diagram:  


3/11/19 0000                                                                    


           3/11/19 0000





Laboratory





Hematology








Test


 3/11/19


00:00 3/11/19


20:16 3/11/19


22:10


 


Red Blood Count


 6.38 M/uL


(4.17-5.56) 


 





 


Mean Corpuscular Volume


 69.3 fL


(80.0-96.0) 


 





 


Mean Corpuscular Hemoglobin


 21.1 pg


(26.0-33.0) 


 





 


Mean Corpuscular Hemoglobin


Concent 30.4 g/dL


(32.0-36.0) 


 





 


Red Cell Distribution Width


 20.5 %


(11.5-14.5) 


 





 


Mean Platelet Volume


 8.4 fL


(7.2-11.1) 


 





 


Neutrophils (%) (Auto)


 69.7 %


(39.4-72.5) 


 





 


Lymphocytes (%) (Auto)


 17.9 %


(17.6-49.6) 


 





 


Monocytes (%) (Auto)


 9.2 %


(4.1-12.4) 


 





 


Eosinophils (%) (Auto)


 1.7 %


(0.4-6.7) 


 





 


Basophils (%) (Auto)


 1.5 %


(0.3-1.4) 


 





 


Nucleated RBC Relative Count


(auto) 0.2 /100WBC 


 


 





 


Neutrophils # (Auto)


 8.6 K/uL


(2.0-7.4) 


 





 


Lymphocytes # (Auto)


 2.2 K/uL


(1.3-3.6) 


 





 


Monocytes # (Auto)


 1.1 K/uL


(0.3-1.0) 


 





 


Eosinophils # (Auto)


 0.2 K/uL


(0.0-0.5) 


 





 


Basophils # (Auto)


 0.2 K/uL


(0.0-0.1) 


 





 


Nucleated RBC Absolute Count


(auto) 0.02 K/uL 


 


 





 


Peripheral Blood Smear No Y/N   


 


Sodium Level


 138 mmol/L


(137-145) 


 





 


Potassium Level


 4.5 mmol/L


(3.5-5.0) 


 





 


Chloride Level


 101 mmol/L


() 


 





 


Carbon Dioxide Level


 27 mmol/L


(22-31) 


 





 


Blood Urea Nitrogen


 24 mg/dl


(7-18) 


 





 


Creatinine


 0.80 mg/dl


(0.52-1.04) 


 





 


Glomerular Filtration Rate


Calc > 60.0 


 


 





 


Random Glucose


 103 mg/dl


() 


 





 


Calcium Level


 9.4 mg/dl


(8.4-10.2) 


 





 


Total Bilirubin


 0.2 mg/dl


(0.2-1.3) 


 





 


Aspartate Amino Transf


(AST/SGOT) 30 U/L (0-35) 


 


 





 


Alanine Aminotransferase


(ALT/SGPT) 25 U/L (0-56) 


 


 





 


Alkaline Phosphatase 83 U/L (0-126)   


 


Total Protein


 7.6 g/dl


(6.3-8.2) 


 





 


Albumin


 4.4 g/dl


(3.5-5.0) 


 





 


Amylase Level


 105 U/L


(0-110) 


 





 


Lipase


 242 U/L


() 


 





 


Urine Color  Yellow  


 


Urine Clarity


 


 Slightly-cloudy


 





 


Urine pH


 


 6.0 pH


(4.8-9.5) 





 


Urine Specific Gravity  1.023  


 


Urine Protein


 


 Negative mg/dL


(NEGATIVE) 





 


Urine Glucose (UA)


 


 Negative mg/dL


(NEGATIVE) 





 


Urine Ketones


 


 Negative mg/dL


(NEGATIVE) 





 


Urine Blood


 


 Negative


(NEGATIVE) 





 


Urine Nitrite


 


 Negative


(NEGATIVE) 





 


Urine Bilirubin


 


 Negative


(NEGATIVE) 





 


Urine Urobilinogen


 


 Negative mg/dL


(0.2-1.9) 





 


Urine Leukocyte Esterase


 


 Negative


(NEGATIVE) 





 


Urine RBC


 


 <1 /HPF


(0-2/HPF) 





 


Urine WBC


 


 4 /HPF


(0-5/HPF) 





 


Urine Squamous Epithelial


Cells 


 Many /LPF


(</=FEW) 





 


Urine Bacteria


 


 Negative /HPF


(NONE-FEW) 





 


Urine Mucus


 


 Few /HPF


(NONE-FEW) 





 


Influenza Virus Type A (PCR)


 


 


 Negative


(NEGATIVE)


 


Influenza Virus Type B (PCR)


 


 


 Negative


(NEGATIVE)








Chemistry








Test


 3/11/19


00:00 3/11/19


20:16 3/11/19


22:10


 


White Blood Count


 12.3 k/uL


(4.5-11.0) 


 





 


Red Blood Count


 6.38 M/uL


(4.17-5.56) 


 





 


Hemoglobin


 13.5 g/dL


(12.0-16.0) 


 





 


Hematocrit


 44.3 %


(34.0-47.0) 


 





 


Mean Corpuscular Volume


 69.3 fL


(80.0-96.0) 


 





 


Mean Corpuscular Hemoglobin


 21.1 pg


(26.0-33.0) 


 





 


Mean Corpuscular Hemoglobin


Concent 30.4 g/dL


(32.0-36.0) 


 





 


Red Cell Distribution Width


 20.5 %


(11.5-14.5) 


 





 


Platelet Count


 242 K/uL


(150-450) 


 





 


Mean Platelet Volume


 8.4 fL


(7.2-11.1) 


 





 


Neutrophils (%) (Auto)


 69.7 %


(39.4-72.5) 


 





 


Lymphocytes (%) (Auto)


 17.9 %


(17.6-49.6) 


 





 


Monocytes (%) (Auto)


 9.2 %


(4.1-12.4) 


 





 


Eosinophils (%) (Auto)


 1.7 %


(0.4-6.7) 


 





 


Basophils (%) (Auto)


 1.5 %


(0.3-1.4) 


 





 


Nucleated RBC Relative Count


(auto) 0.2 /100WBC 


 


 





 


Neutrophils # (Auto)


 8.6 K/uL


(2.0-7.4) 


 





 


Lymphocytes # (Auto)


 2.2 K/uL


(1.3-3.6) 


 





 


Monocytes # (Auto)


 1.1 K/uL


(0.3-1.0) 


 





 


Eosinophils # (Auto)


 0.2 K/uL


(0.0-0.5) 


 





 


Basophils # (Auto)


 0.2 K/uL


(0.0-0.1) 


 





 


Nucleated RBC Absolute Count


(auto) 0.02 K/uL 


 


 





 


Peripheral Blood Smear No Y/N   


 


Glomerular Filtration Rate


Calc > 60.0 


 


 





 


Calcium Level


 9.4 mg/dl


(8.4-10.2) 


 





 


Total Bilirubin


 0.2 mg/dl


(0.2-1.3) 


 





 


Aspartate Amino Transf


(AST/SGOT) 30 U/L (0-35) 


 


 





 


Alanine Aminotransferase


(ALT/SGPT) 25 U/L (0-56) 


 


 





 


Alkaline Phosphatase 83 U/L (0-126)   


 


Total Protein


 7.6 g/dl


(6.3-8.2) 


 





 


Albumin


 4.4 g/dl


(3.5-5.0) 


 





 


Amylase Level


 105 U/L


(0-110) 


 





 


Lipase


 242 U/L


() 


 





 


Urine Color  Yellow  


 


Urine Clarity


 


 Slightly-cloudy


 





 


Urine pH


 


 6.0 pH


(4.8-9.5) 





 


Urine Specific Gravity  1.023  


 


Urine Protein


 


 Negative mg/dL


(NEGATIVE) 





 


Urine Glucose (UA)


 


 Negative mg/dL


(NEGATIVE) 





 


Urine Ketones


 


 Negative mg/dL


(NEGATIVE) 





 


Urine Blood


 


 Negative


(NEGATIVE) 





 


Urine Nitrite


 


 Negative


(NEGATIVE) 





 


Urine Bilirubin


 


 Negative


(NEGATIVE) 





 


Urine Urobilinogen


 


 Negative mg/dL


(0.2-1.9) 





 


Urine Leukocyte Esterase


 


 Negative


(NEGATIVE) 





 


Urine RBC


 


 <1 /HPF


(0-2/HPF) 





 


Urine WBC


 


 4 /HPF


(0-5/HPF) 





 


Urine Squamous Epithelial


Cells 


 Many /LPF


(</=FEW) 





 


Urine Bacteria


 


 Negative /HPF


(NONE-FEW) 





 


Urine Mucus


 


 Few /HPF


(NONE-FEW) 





 


Influenza Virus Type A (PCR)


 


 


 Negative


(NEGATIVE)


 


Influenza Virus Type B (PCR)


 


 


 Negative


(NEGATIVE)








Urinalysis








Test


 3/11/19


20:16


 


Urine Color Yellow 


 


Urine Clarity


 Slightly-cloudy





 


Urine pH


 6.0 pH


(4.8-9.5)


 


Urine Specific Gravity 1.023 


 


Urine Protein


 Negative mg/dL


(NEGATIVE)


 


Urine Glucose (UA)


 Negative mg/dL


(NEGATIVE)


 


Urine Ketones


 Negative mg/dL


(NEGATIVE)


 


Urine Blood


 Negative


(NEGATIVE)


 


Urine Nitrite


 Negative


(NEGATIVE)


 


Urine Bilirubin


 Negative


(NEGATIVE)


 


Urine Urobilinogen


 Negative mg/dL


(0.2-1.9)


 


Urine Leukocyte Esterase


 Negative


(NEGATIVE)


 


Urine RBC


 <1 /HPF


(0-2/HPF)


 


Urine WBC


 4 /HPF


(0-5/HPF)


 


Urine Squamous Epithelial


Cells Many /LPF


(</=FEW)


 


Urine Bacteria


 Negative /HPF


(NONE-FEW)


 


Urine Mucus


 Few /HPF


(NONE-FEW)











EKG/Imaging


Imaging


EXAMINATION:


CT head without IV contrast


 


HISTORY:  Sinus congestion.


 


COMPARISON:  CT sinus from 3/11/2019.


 


TECHNIQUE:   Contiguous axial images were obtained from the skull base to the 


vertex without intravenous contrast.  Sagittal and coronal reformatted images 


are also submitted.


 


One of the following dose optimization techniques was utilized in the 


performance of this exam: Automated exposure control; adjustment of the mA 


and/or kV according to the patient's size; or use of an iterative  


reconstruction technique.  Specific details can be referenced in the facility's 


radiology CT exam operational policy.


 


FINDINGS:


 


Brain volume:  Normal.


 


Ventricles:  Normal.


 


Acute ischemic changes:  None.


 


Hemorrhage:  No acute intracranial hemorrhage.


 


Masses/edema:  None.


 


Gray-white: Negative.


 


White matter:  Normal.


 


Vessels:  Negative.


 


Extra-axial:  Negative.


 


Calvarium/scalp:  Negative.


 


Skull base/visualized face:  Negative.


 


Visualized sinuses/orbits:  Mild mucosal thickening in the right maxillary sinus


and left sphenoid sinus.  There is nasal septal deviation to the right.


 


IMPRESSION:


 


1.  No acute hemorrhage or intracranial mass lesion. No CT evidence of acute 


infarct.


 


2.  Mild inflammation of the right maxillary sinus and left sphenoid sinus well 


be described in further detail on the concurrently performed CT sinus report.


 


Report Dictated By: Jeannine Mayfield MD at 3/11/2019 8:21 PM








EXAMINATION:


CT sinus without IV contrast


 


HISTORY:   Sinus congestion.


 


COMPARISON:  CT head from 3/11/2019.


 


TECHNIQUE:  Contiguous axial images were obtained through the paranasal sinuses 


without intravenous contrast administration. Coronal and sagittal reformatted 


images were obtained from the axial source data.


 


One of the following dose optimization techniques was utilized in the 


performance of this exam: Automated exposure control; adjustment of the mA 


and/or kV according to the patient's size; or use of an iterative  


reconstruction technique.  Specific details can be referenced in the facility's 


radiology CT exam operational policy.


 


FINDINGS:


 


Maxillary sinuses: There is patchy mucosal thickening with inspissated air in 


the right maxillary sinus.


Frontal sinuses:  Minimal mucosal thickening in the bilateral inferior frontal 


sinuses.  Small frontal air cell on the left.


Ethmoid air cells: Minimal mucosal thickening in the bilateral ethmoid air 


cells.


Sphenoid sinuses: The left sphenoid sinus is larger than the right, which is a 


normal variant.  The intersinus septum inserts on the anterior wall of the 


carotid canal.  Mild mucosal thickening in the left sphenoid sinus.


Ostiomeatal units:  Patent.


 


Nasal septum/nasal cavity: There is nasal septal deviation to the right with a 


bone spur projecting laterally causing moderate narrowing of the right nasal 


cavity.


 


Orbits: Negative.


Visualized intracranial contents/soft tissues: Negative.


TMJs:  Negative.


 


There is dental disease of the bilateral mandibular molars partly visualized.


 


IMPRESSION:


 


1.  Patchy nonobstructive inflammation of the paranasal sinuses could be acute 


or chronic, and is worst in the right maxillary sinus.


 


2.  Nasal septal deviation to the right with a bone spur projecting laterally 


causing moderate narrowing of the right nasal cavity.


 


3.  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


 


Report Dictated By: Jeannine Mayfield MD at 3/11/2019 8:24 PM








EXAMINATION: CT abdomen and pelvis with IV contrast


 


HISTORY:  Abdominal pain. Right flank pain. Nausea vomiting diarrhea.


 


TECHNIQUE:   Axial CT images of the abdomen and pelvis were obtained with IV 


contrast, with coronal and sagittal 2D reconstructed images.


One of the following dose optimization techniques was utilized in the 


performance of this exam: Automated exposure control; adjustment of the mA 


and/or kV according to the patient's size; or use of an iterative  frantz


nstruction technique.  Specific details can be referenced in the facility's 


radiology CT exam operational policy.


 


Contrast:  75 mL of IV [id number].


 


COMPARISON:  None.


 


FINDINGS:


Liver:  Negative.


 


Gallbladder and bile ducts:  Negative.


 


Spleen:  Negative.


 


Pancreas:  Negative.


 


Adrenal glands:  Negative.


 


Kidneys:  There is chronic cortical atrophy of the upper pole of the right 


kidney. Single punctate nonobstructing 1-2 mm calculus in the mid right kidney. 


There is mild cortical scarring along the lower pole of the right kidney. Normal


size and morphology of the left kidney. No left-sided urinary calculi. No 


hydronephrosis.


 


Bowel and peritoneum:  The small bowel and colon are normal in caliber, without 


evidence of obstruction or any focal inflammatory process. There are a few 


scattered colonic diverticula without evidence of diverticulitis. Normal 


appendix. No free fluid or free intraperitoneal air.


 


Pelvic  structures:  Negative.


 


Lymph node assessment:  Negative.


 


Vessels:  Negative.


 


Musculoskeletal:   No acute osseous findings. Chronic degenerative changes along


the spine. There is moderate disc space narrowing at L3-L4, with advanced facet 


arthropathy along the lower lumbar facet joints.


 


Body wall:  Negative.


 


Lung bases:  Negative.


 


IMPRESSION:


1. No CT evidence of acute intraabdominal pathology.


2. Chronic parenchymal atrophy along the upper pole of the right kidney with 


mild cortical scarring along the lower pole. Single punctate nonobstructing 


right renal calculus.


3. Mild scattered colonic diverticulosis without evidence of diverticulitis.


4. Normal appendix.


 


Report Dictated By: Juan Manuel Monteiro MD at 3/11/2019 8:30 PM








Examination: CHEST PA LAT


 


Comparison: CT 11/8/2018.


 


History: Congestion. Smoker.


 


Findings: Cardiac and hilar contour size is normal. Mild bronchial thickening. 


No consolidation or discrete nodule. No pneumothorax, edema, or effusion. 


Osseous structures are intact.


 


IMPRESSION:


 


Mild bronchial thickening is suggestive of an acute versus chronic bronchitis. 


No consolidation.


 


Report Dictated By: Bebeto Hodges MD at 3/11/2019 8:32 PM





ED Course/Re-evaluation


Clinical Indication for ER IV:  Hydration, IV Access


ED Course


After initial evaluation, multiple areas of complaints. With the sinusitis and 


headache, went ahead and did a sinus CT scan and a head CT scan. This did show 


sinus disease. Uncertain if this is viral or more structural or if it represents


bacterial sinusitis that has failed treatment. The abdominal complaints could 


certainly be due to a viral syndrome or could be due to side effects from the 


antibiotic use recently as well. CT scan of the abdomen and pelvis was negative 


as were her labs. Reviewed all this with the patient and after discussion, she 


will continue with her regular medications. We will give her some Toradol to use


for pain. Starting Levaquin at this time although discussed this with the 


patient that it may be best to see her nose and throat first prior to starting 


this medicine however given her difficulties with pain at this time his symptoms


we eventually decided to go ahead and start the Levaquin now.


Decision to Disposition Date:  Mar 11, 2019


Decision to Disposition Time:  22:30





Depart


Departure


Latest Vital Signs





Vital Signs








  Date Time  Temp Pulse Resp B/P (MAP) Pulse Ox O2 Delivery O2 Flow Rate FiO2


 


3/11/19 22:42  71  97/60 (72) 93   


 


3/11/19 18:28       2.0 


 


3/11/19 17:58 98.3  14   Room Air  








Impression:  


   Primary Impression:  


   Sinusitis


Condition:  Improved


Disposition:  HOME OR SELF-CARE


New Scripts


Ketorolac Tromethamine (KETOROLAC TROMETHAMINE) 10 Mg Tab


10 MG PO Q6H PRN for PAIN, #12 TAB 0 Refills


   Prov: JERRY NARANJO MD         3/11/19 


Levofloxacin 500 Mg Tab (LEVOFLOXACIN 500 MG TAB) 500 Mg Tablet


500 MG PO QDAY, #10 TAB 0 Refills


   Prov: JERRY NARANJO MD         3/11/19


Patient Instructions:  Sinusitis (ED)





Additional Instructions:  


Continue other medications.


Start Toradol 10mg every 6 hours as needed for pain.


Start Levaquin 500mg once a day.


Call Dr. Monteiro's office to schedule a follow-up appointment.





Problem Qualifiers








   Primary Impression:  


   Sinusitis


   Sinusitis location:  other  Chronicity:  subacute  Qualified Codes:  J01.80 -


   Other acute sinusitis








JERRY NARANJO MD             Mar 11, 2019 18:41

## 2019-03-11 NOTE — RADIOLOGY IMAGING REPORT
FACILITY: [facility] 

 

PATIENT NAME: Liz Villafana

: 1965

MR: [id number]

V: [id number]

EXAM DATE: 

ORDERING PHYSICIAN: JERRY NARANJO

TECHNOLOGIST: 

 

[place] [facility] [place]: Liz Villafana

: 1965

MRN: [id number]

Visit/Account:[id number]

Date of Sevice:  3/11/2019

 

ACCESSION #: [id number]

 

Examination: CHEST PA LAT

 

Comparison: CT 2018.

 

History: Congestion. Smoker.

 

Findings: Cardiac and hilar contour size is normal. Mild bronchial thickening. No consolidation or di
screte nodule. No pneumothorax, edema, or effusion. Osseous structures are intact.

 

IMPRESSION:

 

Mild bronchial thickening is suggestive of an acute versus chronic bronchitis. No consolidation.

 

Report Dictated By: Bebeto Hodges MD at 3/11/2019 8:32 PM

 

Report E-Signed By: Bebeto Hodges MD  at 3/11/2019 8:34 PM

 

WSN:[id number]